# Patient Record
Sex: MALE | Race: WHITE | NOT HISPANIC OR LATINO | Employment: FULL TIME | URBAN - METROPOLITAN AREA
[De-identification: names, ages, dates, MRNs, and addresses within clinical notes are randomized per-mention and may not be internally consistent; named-entity substitution may affect disease eponyms.]

---

## 2020-06-04 ENCOUNTER — HOSPITAL ENCOUNTER (EMERGENCY)
Facility: HOSPITAL | Age: 55
Discharge: HOME | End: 2020-06-04
Admitting: EMERGENCY MEDICINE

## 2020-06-04 VITALS
HEIGHT: 64 IN | DIASTOLIC BLOOD PRESSURE: 82 MMHG | OXYGEN SATURATION: 99 % | WEIGHT: 185.41 LBS | TEMPERATURE: 98.4 F | SYSTOLIC BLOOD PRESSURE: 163 MMHG | HEART RATE: 86 BPM | RESPIRATION RATE: 16 BRPM | BODY MASS INDEX: 31.65 KG/M2

## 2020-06-04 DIAGNOSIS — S01.01XA LACERATION OF SCALP, INITIAL ENCOUNTER: Primary | ICD-10-CM

## 2020-06-04 PROCEDURE — 99283 EMERGENCY DEPT VISIT LOW MDM: CPT

## 2020-06-04 PROCEDURE — 25010000003 LIDOCAINE 1 % SOLUTION

## 2020-06-04 PROCEDURE — 90471 IMMUNIZATION ADMIN: CPT | Performed by: NURSE PRACTITIONER

## 2020-06-04 PROCEDURE — 90715 TDAP VACCINE 7 YRS/> IM: CPT | Performed by: NURSE PRACTITIONER

## 2020-06-04 PROCEDURE — 25010000002 TDAP 5-2.5-18.5 LF-MCG/0.5 SUSPENSION: Performed by: NURSE PRACTITIONER

## 2020-06-04 RX ADMIN — TETANUS TOXOID, REDUCED DIPHTHERIA TOXOID AND ACELLULAR PERTUSSIS VACCINE, ADSORBED 0.5 ML: 5; 2.5; 8; 8; 2.5 SUSPENSION INTRAMUSCULAR at 15:04

## 2020-06-04 NOTE — DISCHARGE INSTRUCTIONS
Elevate.  Use ice 20 minutes at a time several times a day.  Wash twice a day with soap water.  May use Neosporin or bacitracin.  Follow up with your family doctor for suture removal in 10-14 days.  Watch for signs of infection.  Return for any new or worsening symptoms

## 2020-06-04 NOTE — ED PROVIDER NOTES
Subjective   Chief complaint: Laceration      Context: Patient is a 55-year-old male who comes in complaining of a scalp laceration shortly prior to arrival.  He states he was checking his email when he hit his head.  He denies any loss of consciousness unilateral focal deficits weakness confusion ataxia lethargy bleeding from the ears or nose visual changes weakness saddle anesthesia bowel or bladder incontinence or retention or neck pain.  Has been ambulatory since the incident.  Does not believe he is up-to-date on his tetanus immunization.    Duration: shortly pta    Timing: waxes and wanes    Severity: mild    Associated symptoms: denies          PCP:  none          Review of Systems   HENT: Negative.    Respiratory: Negative.    Gastrointestinal: Negative for vomiting.   Musculoskeletal: Negative.    Skin: Positive for wound.   Allergic/Immunologic: Negative for immunocompromised state.   Neurological: Negative.    Hematological: Does not bruise/bleed easily.       No past medical history on file.    Allergies   Allergen Reactions   • Advil [Ibuprofen] Hives       No past surgical history on file.    No family history on file.    Social History     Socioeconomic History   • Marital status:      Spouse name: Not on file   • Number of children: Not on file   • Years of education: Not on file   • Highest education level: Not on file           Objective   Physical Exam     Vital signs and triage nurse note reviewed.   Constitutional: Awake, alert; well-developed and well-nourished. No acute distress is noted.   HEENT: Normocephalic,; pupils are PERRL with intact EOM; oropharynx is pink and moist without exudate or erythema. Patient has a jagged 6 cm x 3 cm L-shaped laceration noted to the left scalp with some underlying swelling.  Neck: Supple, full range of motion without pain; no cervical lymphadenopathy.   Cardiovascular: Regular rate and rhythm, normal S1-S2.   Pulmonary: Respiratory effort regular  nonlabored, breath sounds clear to auscultation all fields.   Abdomen: Soft, nontender nondistended with normoactive bowel sounds; no rebound or guarding.   Musculoskeletal: Independent range of motion of all extremities with no palpable tenderness or edema.   Neuro: Alert oriented x3, speech is clear and appropriate, GCS 15   Skin:  Fleshtone warm, dry, intact; no erythematous or petechial rash or lesion       Laceration Repair  Date/Time: 6/4/2020 3:39 PM  Performed by: Talia Gambino APRN  Authorized by: Talia Gambino APRN     Consent:     Consent obtained:  Verbal    Consent given by:  Patient    Risks discussed:  Infection, need for additional repair, poor cosmetic result and pain    Alternatives discussed:  Observation and no treatment  Anesthesia (see MAR for exact dosages):     Anesthesia method:  Local infiltration    Local anesthetic:  Lidocaine 1% w/o epi and bupivacaine 0.25% w/o epi  Laceration details:     Location:  Scalp    Scalp location:  L temporal    Length (cm):  9  Repair type:     Repair type:  Simple  Pre-procedure details:     Preparation:  Patient was prepped and draped in usual sterile fashion  Treatment:     Area cleansed with:  Hibiclens  Skin repair:     Repair method:  Sutures    Suture size:  5-0    Suture material:  Nylon    Suture technique:  Simple interrupted    Number of sutures:  9  Post-procedure details:     Dressing:  Open (no dressing)               ED Course      Labs Reviewed - No data to display  Medications   Tdap (BOOSTRIX) injection 0.5 mL (0.5 mL Intramuscular Given 6/4/20 1504)     No radiology results for the last day                                       MDM  Number of Diagnoses or Management Options  Laceration of scalp, initial encounter:   Diagnosis management comments:     Comorbidities:  has no past medical history on file.  Differentials: Concussion ICH felt unlikely based on HPI and physical exam not all inclusive of differentials  considered  Radiology interpretation: Agrees is not warranted    Appropriate PPE worn during exam.    i discussed findings with patient who voices understanding of discharge instructions, signs and symptoms requiring return to ED; discharged improved and in stable condition with follow up for re-evaluation.  On reexam patient chloé awake alert and oriented neurologically intact.      Final diagnoses:   Laceration of scalp, initial encounter            Talia Gambino, APRN  06/04/20 1546

## 2021-11-09 ENCOUNTER — LAB (OUTPATIENT)
Dept: LAB | Facility: HOSPITAL | Age: 56
End: 2021-11-09

## 2021-11-09 ENCOUNTER — TRANSCRIBE ORDERS (OUTPATIENT)
Dept: ADMINISTRATIVE | Facility: HOSPITAL | Age: 56
End: 2021-11-09

## 2021-11-09 DIAGNOSIS — Z11.52 ENCOUNTER FOR SCREENING FOR SEVERE ACUTE RESPIRATORY SYNDROME CORONAVIRUS 2 (SARS-COV-2) INFECTION: Primary | ICD-10-CM

## 2021-11-09 DIAGNOSIS — Z11.52 ENCOUNTER FOR SCREENING FOR SEVERE ACUTE RESPIRATORY SYNDROME CORONAVIRUS 2 (SARS-COV-2) INFECTION: ICD-10-CM

## 2021-11-09 LAB — SARS-COV-2 ORF1AB RESP QL NAA+PROBE: NOT DETECTED

## 2021-11-09 PROCEDURE — C9803 HOPD COVID-19 SPEC COLLECT: HCPCS

## 2021-11-09 PROCEDURE — U0004 COV-19 TEST NON-CDC HGH THRU: HCPCS
